# Patient Record
Sex: FEMALE | Race: WHITE
[De-identification: names, ages, dates, MRNs, and addresses within clinical notes are randomized per-mention and may not be internally consistent; named-entity substitution may affect disease eponyms.]

---

## 2017-02-14 ENCOUNTER — HOSPITAL ENCOUNTER (OUTPATIENT)
Dept: HOSPITAL 45 - C.RADPV | Age: 67
Discharge: HOME | End: 2017-02-14
Attending: NURSE PRACTITIONER
Payer: COMMERCIAL

## 2017-02-14 DIAGNOSIS — R07.81: Primary | ICD-10-CM

## 2017-02-14 NOTE — DIAGNOSTIC IMAGING REPORT
RIBS BILATERAL WITH PA CHEST



CLINICAL HISTORY: Rib pain on right side pain



COMPARISON STUDY:  4/18/2016



FINDINGS: Negative study of the ribs bilaterally. No evidence for fracture.

Lungs are clear.



IMPRESSION:  Negative study 









Electronically signed by:  Mata Enriquez M.D.

2/14/2017 3:19 PM



Dictated Date/Time:  2/14/2017 3:17 PM

## 2017-06-05 ENCOUNTER — HOSPITAL ENCOUNTER (OUTPATIENT)
Dept: HOSPITAL 45 - C.MAMM | Age: 67
Discharge: HOME | End: 2017-06-05
Attending: GENERAL PRACTICE
Payer: COMMERCIAL

## 2017-06-05 DIAGNOSIS — Z12.31: Primary | ICD-10-CM

## 2017-06-06 NOTE — MAMMOGRAPHY REPORT
BILATERAL DIGITAL SCREENING MAMMOGRAM TOMOSYNTHESIS WITH CAD: 6/5/2017

CLINICAL HISTORY: Routine screening.  Patient has no complaints.  





TECHNIQUE:  Breast tomosynthesis in addition to standard 2D mammography was performed. Current study 
was also evaluated with a Computer Aided Detection (CAD) system.  



COMPARISON: Comparison is made to exams dated:  6/2/2016 mammogram, 6/1/2015 mammogram, 5/30/2014 [facility]
mogram, 5/24/2013 mammogram, 5/18/2012 mammogram, and 5/13/2011 mammogram - [facility]
ter.   



BREAST COMPOSITION:  The tissue of both breasts is heterogeneously dense, which may obscure small mas
ses.  



FINDINGS:  The parenchymal pattern is unchanged. No developing mass, architectural distortion or clus
ter of suspicious microcalcifications is seen in either breast.  





IMPRESSION:  ACR BI-RADS CATEGORY 2: BENIGN

There is no mammographic evidence of malignancy. A 1 year screening mammogram is recommended.  The pa
tient will receive written notification of the results.  





Approximately 10% of breast cancers are not detected with mammography. A negative mammographic report
 should not delay biopsy if a clinically suggestive mass is present.



Sherly Pittman M.D.          

ay/:6/5/2017 21:40:05  



Imaging Technologist: Madiha KENYON(HI)(LIONEL)(BD), [facility]

letter sent: Normal 1/2  

BI-RADS Code: ACR BI-RADS Category 2: Benign

## 2017-07-31 ENCOUNTER — HOSPITAL ENCOUNTER (OUTPATIENT)
Dept: HOSPITAL 45 - C.LABPVFM | Age: 67
Discharge: HOME | End: 2017-07-31
Attending: GENERAL PRACTICE
Payer: COMMERCIAL

## 2017-07-31 DIAGNOSIS — E03.9: ICD-10-CM

## 2017-07-31 DIAGNOSIS — R03.0: ICD-10-CM

## 2017-07-31 DIAGNOSIS — E78.5: Primary | ICD-10-CM

## 2017-07-31 LAB
ALBUMIN/GLOB SERPL: 0.8 {RATIO} (ref 0.9–2)
ALP SERPL-CCNC: 71 U/L (ref 45–117)
ALT SERPL-CCNC: 28 U/L (ref 12–78)
ANION GAP SERPL CALC-SCNC: 4 MMOL/L (ref 3–11)
AST SERPL-CCNC: 27 U/L (ref 15–37)
BUN SERPL-MCNC: 20 MG/DL (ref 7–18)
BUN/CREAT SERPL: 17.7 (ref 10–20)
CALCIUM SERPL-MCNC: 9.4 MG/DL (ref 8.5–10.1)
CHLORIDE SERPL-SCNC: 101 MMOL/L (ref 98–107)
CHOLEST/HDLC SERPL: 4.3 {RATIO}
CO2 SERPL-SCNC: 32 MMOL/L (ref 21–32)
CREAT SERPL-MCNC: 1.1 MG/DL (ref 0.6–1.2)
GLOBULIN SER-MCNC: 4.5 GM/DL (ref 2.5–4)
GLUCOSE SERPL-MCNC: 88 MG/DL (ref 70–99)
GLUCOSE UR QL: 46 MG/DL
KETONES UR QL STRIP: 108 MG/DL
NITRITE UR QL STRIP: 210 MG/DL (ref 0–150)
PH UR: 196 MG/DL (ref 0–200)
POTASSIUM SERPL-SCNC: 3.7 MMOL/L (ref 3.5–5.1)
SODIUM SERPL-SCNC: 137 MMOL/L (ref 136–145)
TSH SERPL-ACNC: 0.33 UIU/ML (ref 0.3–4.5)
VERY LOW DENSITY LIPOPROT CALC: 42 MG/DL

## 2018-05-09 ENCOUNTER — HOSPITAL ENCOUNTER (OUTPATIENT)
Dept: HOSPITAL 45 - C.LABPVFM | Age: 68
Discharge: HOME | End: 2018-05-09
Attending: FAMILY MEDICINE
Payer: COMMERCIAL

## 2018-05-09 DIAGNOSIS — E03.9: ICD-10-CM

## 2018-05-09 DIAGNOSIS — R09.82: ICD-10-CM

## 2018-05-09 DIAGNOSIS — E78.5: Primary | ICD-10-CM

## 2018-05-09 LAB
ALBUMIN SERPL-MCNC: 3.3 GM/DL (ref 3.4–5)
ALP SERPL-CCNC: 73 U/L (ref 45–117)
ALT SERPL-CCNC: 24 U/L (ref 12–78)
AST SERPL-CCNC: 24 U/L (ref 15–37)
BUN SERPL-MCNC: 19 MG/DL (ref 7–18)
CALCIUM SERPL-MCNC: 8.4 MG/DL (ref 8.5–10.1)
CO2 SERPL-SCNC: 30 MMOL/L (ref 21–32)
CREAT SERPL-MCNC: 1.05 MG/DL (ref 0.6–1.2)
GLUCOSE SERPL-MCNC: 98 MG/DL (ref 70–99)
KETONES UR QL STRIP: 87 MG/DL
PH UR: 163 MG/DL (ref 0–200)
POTASSIUM SERPL-SCNC: 3.7 MMOL/L (ref 3.5–5.1)
PROT SERPL-MCNC: 8.2 GM/DL (ref 6.4–8.2)
SODIUM SERPL-SCNC: 138 MMOL/L (ref 136–145)